# Patient Record
Sex: FEMALE | Race: WHITE | NOT HISPANIC OR LATINO | URBAN - METROPOLITAN AREA
[De-identification: names, ages, dates, MRNs, and addresses within clinical notes are randomized per-mention and may not be internally consistent; named-entity substitution may affect disease eponyms.]

---

## 2020-02-14 PROBLEM — I48.3 TYPICAL ATRIAL FLUTTER: Status: ACTIVE | Noted: 2017-07-19

## 2020-02-14 PROBLEM — E78.00 PURE HYPERCHOLESTEROLEMIA: Status: ACTIVE | Noted: 2018-11-02

## 2020-02-14 PROBLEM — E11.9 TYPE 2 DIABETES MELLITUS WITHOUT COMPLICATION, WITHOUT LONG-TERM CURRENT USE OF INSULIN: Status: ACTIVE | Noted: 2018-11-02

## 2022-02-15 ENCOUNTER — PATIENT OUTREACH (OUTPATIENT)
Dept: ADMINISTRATIVE | Facility: HOSPITAL | Age: 77
End: 2022-02-15

## 2024-06-17 PROBLEM — E66.01 SEVERE OBESITY (BMI 35.0-39.9) WITH COMORBIDITY: Status: ACTIVE | Noted: 2024-06-17

## 2025-01-02 PROBLEM — N18.31 STAGE 3A CHRONIC KIDNEY DISEASE: Status: ACTIVE | Noted: 2025-01-02

## 2025-01-02 PROBLEM — I50.22 CHRONIC SYSTOLIC CHF (CONGESTIVE HEART FAILURE): Status: ACTIVE | Noted: 2025-01-02

## 2025-01-16 PROBLEM — I48.11 LONGSTANDING PERSISTENT ATRIAL FIBRILLATION: Chronic | Status: ACTIVE | Noted: 2024-08-15

## 2025-01-16 PROBLEM — I44.2 COMPLETE AV BLOCK: Status: ACTIVE | Noted: 2024-06-26

## 2025-01-16 PROBLEM — I31.39 PERICARDIAL EFFUSION: Chronic | Status: ACTIVE | Noted: 2025-01-07

## 2025-01-30 ENCOUNTER — PATIENT OUTREACH (OUTPATIENT)
Dept: ADMINISTRATIVE | Facility: CLINIC | Age: 80
End: 2025-01-30

## 2025-02-12 ENCOUNTER — PATIENT OUTREACH (OUTPATIENT)
Dept: ADMINISTRATIVE | Facility: CLINIC | Age: 80
End: 2025-02-12

## 2025-02-12 PROBLEM — M35.00 SICCA COMPLEX: Status: ACTIVE | Noted: 2025-02-07

## 2025-02-12 PROBLEM — I50.33 ACUTE ON CHRONIC DIASTOLIC (CONGESTIVE) HEART FAILURE: Status: ACTIVE | Noted: 2025-01-24

## 2025-02-12 PROBLEM — M15.0 PRIMARY OSTEOARTHRITIS INVOLVING MULTIPLE JOINTS: Status: ACTIVE | Noted: 2025-02-07

## 2025-02-12 PROBLEM — I73.00 RAYNAUD'S PHENOMENON WITHOUT GANGRENE: Status: ACTIVE | Noted: 2025-02-07

## 2025-02-12 PROBLEM — I48.21 PERMANENT ATRIAL FIBRILLATION: Status: ACTIVE | Noted: 2023-08-11

## 2025-02-12 PROBLEM — J90 PLEURAL EFFUSION, BILATERAL: Status: ACTIVE | Noted: 2025-01-22

## 2025-02-12 PROBLEM — R00.0 TACHYCARDIA INDUCED CARDIOMYOPATHY: Chronic | Status: ACTIVE | Noted: 2024-06-26

## 2025-02-12 PROBLEM — D64.9 NORMOCYTIC ANEMIA: Status: ACTIVE | Noted: 2025-02-07

## 2025-02-12 PROBLEM — R06.00 DYSPNEA: Status: ACTIVE | Noted: 2025-01-20

## 2025-02-12 PROBLEM — N17.9 ACUTE RENAL FAILURE SUPERIMPOSED ON STAGE 3 CHRONIC KIDNEY DISEASE: Status: ACTIVE | Noted: 2025-01-21

## 2025-02-12 PROBLEM — I43 TACHYCARDIA INDUCED CARDIOMYOPATHY: Chronic | Status: ACTIVE | Noted: 2024-06-26

## 2025-02-12 PROBLEM — E66.812 CLASS 2 SEVERE OBESITY DUE TO EXCESS CALORIES WITH SERIOUS COMORBIDITY AND BODY MASS INDEX (BMI) OF 35.0 TO 35.9 IN ADULT: Status: ACTIVE | Noted: 2024-06-17

## 2025-02-12 PROBLEM — N18.30 ACUTE RENAL FAILURE SUPERIMPOSED ON STAGE 3 CHRONIC KIDNEY DISEASE: Status: ACTIVE | Noted: 2025-01-21

## 2025-02-12 PROBLEM — R76.8 POSITIVE ANA (ANTINUCLEAR ANTIBODY): Status: ACTIVE | Noted: 2025-02-07

## 2025-02-12 PROBLEM — E87.5 HYPERKALEMIA: Status: ACTIVE | Noted: 2025-01-22

## 2025-02-12 PROBLEM — E87.1 HYPONATREMIA: Status: ACTIVE | Noted: 2025-01-22

## 2025-02-12 PROBLEM — I42.8 TACHYCARDIA INDUCED CARDIOMYOPATHY: Chronic | Status: ACTIVE | Noted: 2024-06-26

## 2025-02-12 PROBLEM — I49.5 SSS (SICK SINUS SYNDROME): Status: ACTIVE | Noted: 2024-12-12

## 2025-02-12 PROBLEM — R09.02 HYPOXIA: Status: ACTIVE | Noted: 2025-01-20

## 2025-02-12 NOTE — PROGRESS NOTES
C3 nurse attempted to contact Ragini Roman for a TCC post hospital discharge follow up call. No answer. Left voicemail with callback information. The patient completed a HOSFU appointment with Sergey Willett MD (PCP) on 2/12/25. She also has appointments with Perry Martin MD (Rheumatology) on 2/14/25 @ 10:00am, and Anoop Villela MD (cardiology) on 2/14/25 @ 12:30pm.

## 2025-02-13 NOTE — PROGRESS NOTES
3rd attempt-C3 nurse attempted to contact Ragini Roman for a TCC post hospital discharge follow up call. No answer. Left voicemail with callback information, and OOC#. The patient completed a HOSFU appointment with Sergey Willett MD (PCP) on 2/12/25. She also has appointments with Perry Martin MD (Rheumatology) on 2/14/25 @ 10:00am, and Anoop Villela MD (cardiology) on 2/14/25 @ 12:30pm.

## 2025-02-13 NOTE — PROGRESS NOTES
2nd attempt-C3 nurse attempted to contact Ragini Roman for a TCC post hospital discharge follow up call. No answer. Left voicemail with callback information. The patient completed a HOSFU appointment with Sergey Willett MD (PCP) on 2/12/25. She also has appointments with Perry Martin MD (Rheumatology) on 2/14/25 @ 10:00am, and Anoop Villela MD (cardiology) on 2/14/25 @ 12:30pm.

## 2025-05-15 ENCOUNTER — PATIENT OUTREACH (OUTPATIENT)
Dept: ADMINISTRATIVE | Facility: CLINIC | Age: 80
End: 2025-05-15

## 2025-05-15 NOTE — PROGRESS NOTES
TCC call not required and not applicable due to pt [facility] within [facility] past year. No messages routed at this time.

## 2025-05-19 PROBLEM — I70.0 AORTIC ATHEROSCLEROSIS: Status: ACTIVE | Noted: 2025-05-19

## 2025-05-19 LAB
LEFT EYE DM RETINOPATHY: NEGATIVE
RIGHT EYE DM RETINOPATHY: NEGATIVE

## 2025-08-19 ENCOUNTER — PATIENT OUTREACH (OUTPATIENT)
Dept: ADMINISTRATIVE | Facility: HOSPITAL | Age: 80
End: 2025-08-19